# Patient Record
Sex: MALE | URBAN - METROPOLITAN AREA
[De-identification: names, ages, dates, MRNs, and addresses within clinical notes are randomized per-mention and may not be internally consistent; named-entity substitution may affect disease eponyms.]

---

## 2019-09-19 ENCOUNTER — IMPORTED ENCOUNTER (OUTPATIENT)
Dept: URBAN - METROPOLITAN AREA CLINIC 9 | Facility: CLINIC | Age: 59
End: 2019-09-19

## 2019-10-15 ENCOUNTER — IMPORTED ENCOUNTER (OUTPATIENT)
Dept: URBAN - METROPOLITAN AREA CLINIC 9 | Facility: CLINIC | Age: 59
End: 2019-10-15

## 2019-10-31 ENCOUNTER — IMPORTED ENCOUNTER (OUTPATIENT)
Dept: URBAN - METROPOLITAN AREA CLINIC 9 | Facility: CLINIC | Age: 59
End: 2019-10-31

## 2019-11-07 ENCOUNTER — IMPORTED ENCOUNTER (OUTPATIENT)
Dept: URBAN - METROPOLITAN AREA CLINIC 9 | Facility: CLINIC | Age: 59
End: 2019-11-07

## 2020-09-29 NOTE — PATIENT DISCUSSION
Patient called and reported seeing double when looking to the left.  He was referred to either Dr. Michelet Veliz or Dr. Clif Brooks on 11/18/2020,.

## 2021-09-30 NOTE — PATIENT DISCUSSION
Patient called and reported seeing double when looking to the left.  He was referred to either Dr. Shawna Suazo or Dr. Gertrude Moran on 11/18/2020,.

## 2021-10-16 ASSESSMENT — TONOMETRY
OS_IOP_MMHG: 13
OD_IOP_MMHG: 13
OS_IOP_MMHG: 7
OD_IOP_MMHG: 7
OD_IOP_MMHG: 12

## 2021-10-16 ASSESSMENT — VISUAL ACUITY
OD_CC: 20/80 SN
OS_SC: 20/200 SN
OD_SC: 20/60 SN
OS_CC: 20/60 SN
OS_SC: 20/40 SN
OD_SC: 20/40 SN
OD_SC: 20/400 SN
OD_CC: 20/200 SN
OD_CC: 20/100 SN
OD_PH: 20/50 SN
OD_PH: 20/30 SN
OS_CC: 20/40 SN
OD_PH: 20/70 - SN

## 2021-10-16 ASSESSMENT — KERATOMETRY
OD_K2POWER_DIOPTERS: 43.25
OS_AXISANGLE2_DEGREES: 156
OD_K1POWER_DIOPTERS: 41.25
OS_K2POWER_DIOPTERS: 44.75
OD_AXISANGLE2_DEGREES: 7
OS_AXISANGLE_DEGREES: 66
OD_AXISANGLE_DEGREES: 97
OS_K1POWER_DIOPTERS: 43.75

## 2022-07-02 RX ORDER — PREGABALIN 75 MG/1
1 CAPSULE ORAL
COMMUNITY
Start: 2021-12-09 | End: 2022-11-03 | Stop reason: SDUPTHER

## 2022-07-02 RX ORDER — OXYCODONE HYDROCHLORIDE AND ACETAMINOPHEN 5; 325 MG/1; MG/1
TABLET ORAL
COMMUNITY
End: 2022-09-13

## 2022-07-02 RX ORDER — LISINOPRIL 40 MG/1
TABLET ORAL
COMMUNITY

## 2022-07-02 RX ORDER — LIDOCAINE 50 MG/G
PATCH TOPICAL
COMMUNITY
Start: 2022-03-24 | End: 2022-09-13

## 2022-07-02 RX ORDER — AMLODIPINE BESYLATE 10 MG/1
TABLET ORAL
COMMUNITY
Start: 2022-05-03

## 2022-07-02 RX ORDER — CLINDAMYCIN HYDROCHLORIDE 150 MG/1
CAPSULE ORAL
COMMUNITY
End: 2022-09-13

## 2022-07-02 RX ORDER — IBUPROFEN 800 MG/1
TABLET ORAL
COMMUNITY

## 2022-07-02 RX ORDER — AMOXICILLIN AND CLAVULANATE POTASSIUM 875; 125 MG/1; MG/1
TABLET, FILM COATED ORAL
COMMUNITY
End: 2022-09-13

## 2022-07-02 RX ORDER — BACLOFEN 10 MG/1
TABLET ORAL
COMMUNITY
End: 2022-09-13

## 2022-07-07 PROBLEM — M17.12 PRIMARY OSTEOARTHRITIS OF LEFT KNEE: Status: ACTIVE | Noted: 2022-07-07

## 2022-07-07 PROBLEM — R10.84 GENERALIZED ABDOMINAL PAIN: Status: ACTIVE | Noted: 2022-07-07

## 2022-07-07 PROBLEM — M50.90 CERVICAL DISC DISEASE: Status: ACTIVE | Noted: 2022-07-07

## 2022-07-07 PROBLEM — E29.1 HYPOGONADISM IN MALE: Status: ACTIVE | Noted: 2022-07-07

## 2022-07-07 PROBLEM — R97.20 INCREASING PROSTATE SPECIFIC ANTIGEN LEVEL: Status: ACTIVE | Noted: 2022-07-07

## 2022-07-07 PROBLEM — I10 ESSENTIAL (PRIMARY) HYPERTENSION: Status: ACTIVE | Noted: 2022-07-07

## 2022-07-07 PROBLEM — M79.671 PAIN IN RIGHT FOOT: Status: ACTIVE | Noted: 2022-07-07

## 2022-07-07 PROBLEM — E11.8 DIABETIC FOOT (HCC): Status: ACTIVE | Noted: 2022-07-07

## 2022-07-07 PROBLEM — S68.114A: Status: ACTIVE | Noted: 2022-07-07

## 2022-07-07 PROBLEM — M47.812 CERVICAL SPONDYLOSIS WITHOUT MYELOPATHY: Status: ACTIVE | Noted: 2022-07-07

## 2022-07-07 PROBLEM — R53.83 OTHER FATIGUE: Status: ACTIVE | Noted: 2022-07-07

## 2022-07-07 PROBLEM — R20.2 PARESTHESIA: Status: ACTIVE | Noted: 2022-07-07

## 2022-07-07 PROBLEM — F41.9 ANXIETY: Status: ACTIVE | Noted: 2022-07-07

## 2022-07-07 PROBLEM — I73.9 PAD (PERIPHERAL ARTERY DISEASE) (HCC): Status: ACTIVE | Noted: 2022-07-07

## 2022-07-07 PROBLEM — M54.50 ACUTE LEFT-SIDED LOW BACK PAIN WITHOUT SCIATICA: Status: ACTIVE | Noted: 2022-07-07

## 2022-07-07 PROBLEM — R53.82 CHRONIC FATIGUE: Status: ACTIVE | Noted: 2022-07-07

## 2022-07-07 PROBLEM — K81.9 CHOLECYSTITIS: Status: ACTIVE | Noted: 2022-07-07

## 2022-07-07 PROBLEM — M25.561 ACUTE PAIN OF RIGHT KNEE: Status: ACTIVE | Noted: 2022-07-07

## 2022-07-07 PROBLEM — E11.40 DIABETIC NEUROPATHY (HCC): Status: ACTIVE | Noted: 2022-07-07

## 2022-07-07 PROBLEM — N52.9 ERECTILE DYSFUNCTION: Status: ACTIVE | Noted: 2022-07-07

## 2022-07-07 PROBLEM — G56.21 CUBITAL TUNNEL SYNDROME ON RIGHT: Status: ACTIVE | Noted: 2022-07-07

## 2022-07-07 PROBLEM — K80.20 CALCULUS OF GALLBLADDER WITHOUT CHOLECYSTITIS WITHOUT OBSTRUCTION: Status: ACTIVE | Noted: 2022-07-07
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
## 2022-07-07 PROBLEM — R73.01 ELEVATED FASTING GLUCOSE: Status: ACTIVE | Noted: 2022-07-07

## 2022-07-07 PROBLEM — E78.00 PURE HYPERCHOLESTEROLEMIA: Status: ACTIVE | Noted: 2022-07-07

## 2022-07-07 PROBLEM — M79.672 PAIN OF LEFT FOOT: Status: ACTIVE | Noted: 2022-07-07

## 2022-07-07 PROBLEM — M54.42 ACUTE LEFT-SIDED LOW BACK PAIN WITH LEFT-SIDED SCIATICA: Status: ACTIVE | Noted: 2022-07-07

## 2022-07-07 PROBLEM — F32.1 CURRENT MODERATE EPISODE OF MAJOR DEPRESSIVE DISORDER WITHOUT PRIOR EPISODE (HCC): Status: ACTIVE | Noted: 2022-07-07

## 2022-07-07 PROBLEM — M54.42 LUMBAGO WITH SCIATICA, LEFT SIDE: Status: ACTIVE | Noted: 2022-07-07

## 2022-07-07 PROBLEM — J43.9 EMPHYSEMA, UNSPECIFIED (HCC): Status: ACTIVE | Noted: 2022-07-07

## 2022-07-07 PROBLEM — L20.82 FLEXURAL ECZEMA: Status: ACTIVE | Noted: 2022-07-07

## 2022-07-07 PROBLEM — F32.A DEPRESSION: Status: ACTIVE | Noted: 2022-07-07

## 2022-07-07 PROBLEM — K42.9 UMBILICAL HERNIA WITHOUT OBSTRUCTION AND WITHOUT GANGRENE: Status: ACTIVE | Noted: 2022-07-07

## 2022-07-07 PROBLEM — G62.9 NEUROPATHY: Status: ACTIVE | Noted: 2022-07-07

## 2022-07-07 PROBLEM — R73.03 PREDIABETES: Status: ACTIVE | Noted: 2022-07-07

## 2022-07-07 PROBLEM — H54.7 VISION LOSS: Status: ACTIVE | Noted: 2022-07-07

## 2022-07-07 PROBLEM — R26.2 DISABILITY OF WALKING: Status: ACTIVE | Noted: 2022-07-07

## 2022-07-07 PROBLEM — M51.36 DEGENERATIVE DISC DISEASE, LUMBAR: Status: ACTIVE | Noted: 2022-07-07

## 2022-07-07 PROBLEM — L30.9 ECZEMA: Status: ACTIVE | Noted: 2022-07-07

## 2022-07-07 PROBLEM — K43.6 INCARCERATED VENTRAL HERNIA: Status: ACTIVE | Noted: 2022-07-07

## 2022-07-07 PROBLEM — M17.0 ARTHRITIS OF BOTH KNEES: Status: ACTIVE | Noted: 2022-07-07

## 2022-07-07 PROBLEM — M53.3 COCCYODYNIA: Status: ACTIVE | Noted: 2022-07-07

## 2022-07-07 PROBLEM — M21.6X9 PRONATION OF FOOT: Status: ACTIVE | Noted: 2022-07-07

## 2022-07-07 PROBLEM — K63.5 POLYP OF COLON: Status: ACTIVE | Noted: 2022-07-07

## 2022-07-08 RX ORDER — SIMVASTATIN 40 MG
TABLET ORAL
COMMUNITY
End: 2022-09-12 | Stop reason: CLARIF

## 2022-07-08 RX ORDER — TRIAMCINOLONE ACETONIDE 5 MG/G
CREAM TOPICAL
COMMUNITY
Start: 2021-11-17

## 2022-08-25 PROBLEM — M75.50 BURSITIS OF SHOULDER: Status: ACTIVE | Noted: 2018-08-08

## 2022-08-25 PROBLEM — S81.802A UNSPECIFIED OPEN WOUND, LEFT LOWER LEG, INITIAL ENCOUNTER: Status: ACTIVE | Noted: 2021-08-30

## 2022-08-25 PROBLEM — G56.03 BILATERAL CARPAL TUNNEL SYNDROME: Status: ACTIVE | Noted: 2019-02-04

## 2022-08-25 PROBLEM — M75.110 PARTIAL THICKNESS ROTATOR CUFF TEAR: Status: ACTIVE | Noted: 2018-08-08

## 2022-08-25 PROBLEM — M25.519 SHOULDER PAIN: Status: ACTIVE | Noted: 2019-07-01

## 2022-08-25 PROBLEM — M54.2 NECK PAIN: Status: ACTIVE | Noted: 2019-02-04

## 2022-09-13 PROBLEM — M54.12 CERVICAL RADICULITIS: Status: ACTIVE | Noted: 2019-07-01

## 2022-09-13 PROBLEM — M54.12 CERVICAL RADICULOPATHY: Status: ACTIVE | Noted: 2018-08-08

## 2022-09-13 PROBLEM — R73.01 ELEVATED FASTING GLUCOSE: Status: RESOLVED | Noted: 2022-07-07 | Resolved: 2022-09-13

## 2022-09-13 PROBLEM — E11.8 DIABETIC FOOT (HCC): Status: RESOLVED | Noted: 2022-07-07 | Resolved: 2022-09-13

## 2022-09-13 PROBLEM — M48.02 SPINAL STENOSIS IN CERVICAL REGION: Status: ACTIVE | Noted: 2019-11-11

## 2022-10-04 NOTE — PATIENT DISCUSSION
Patient called and reported seeing double when looking to the left.  He was referred to either Dr. Moira Lott or Dr. Madelyn Westbrook on 11/18/2020,.